# Patient Record
Sex: FEMALE | URBAN - METROPOLITAN AREA
[De-identification: names, ages, dates, MRNs, and addresses within clinical notes are randomized per-mention and may not be internally consistent; named-entity substitution may affect disease eponyms.]

---

## 2024-09-19 ENCOUNTER — TELEPHONE (OUTPATIENT)
Facility: CLINIC | Age: 34
End: 2024-09-19

## 2024-09-19 NOTE — TELEPHONE ENCOUNTER
----- Message from Ariana LIN sent at 9/19/2024  9:09 AM EDT -----  Regarding: [facility] Appointment Request  [facility] Appointment Request    Patient needs appointment for [facility] Appointment Type: New to Provider.    Patient Requested Dates(s): September 24 or October 1, 2024   Patient Requested Time: Any time of the day  Provider Name:  female pcp     Reason for Appointment Request: New Patient - No appointments available during search.  The patient is requesting to set an appointment to be establish  with any of the female PCP.  --------------------------------------------------------------------------------------------------------------------------    Relationship to Patient: Self     Call Back Information: OK to leave message on "[employer]  Preferred Call Back Number: Phone  [phone]

## 2024-11-18 ENCOUNTER — OFFICE VISIT (OUTPATIENT)
Facility: CLINIC | Age: 34
End: 2024-11-18

## 2024-11-18 VITALS
SYSTOLIC BLOOD PRESSURE: 113 MMHG | DIASTOLIC BLOOD PRESSURE: 77 MMHG | WEIGHT: 273.6 LBS | HEART RATE: 64 BPM | BODY MASS INDEX: 39.17 KG/M2 | TEMPERATURE: 96.8 F | OXYGEN SATURATION: 99 % | HEIGHT: 70 IN | RESPIRATION RATE: 17 BRPM

## 2024-11-18 DIAGNOSIS — Z11.59 NEED FOR HEPATITIS C SCREENING TEST: ICD-10-CM

## 2024-11-18 DIAGNOSIS — Z00.00 ENCOUNTER FOR WELL ADULT EXAM WITHOUT ABNORMAL FINDINGS: Primary | ICD-10-CM

## 2024-11-18 DIAGNOSIS — Z11.4 SCREENING FOR HIV WITHOUT PRESENCE OF RISK FACTORS: ICD-10-CM

## 2024-11-18 DIAGNOSIS — R45.89 OTHER SYMPTOMS AND SIGNS INVOLVING EMOTIONAL STATE: ICD-10-CM

## 2024-11-18 ASSESSMENT — ENCOUNTER SYMPTOMS
ABDOMINAL PAIN: 0
VOMITING: 0
BLOOD IN STOOL: 0
COUGH: 0
CONSTIPATION: 0
DIARRHEA: 0
NAUSEA: 0
SHORTNESS OF BREATH: 0
ABDOMINAL DISTENTION: 0

## 2024-11-18 ASSESSMENT — PATIENT HEALTH QUESTIONNAIRE - PHQ9
SUM OF ALL RESPONSES TO PHQ QUESTIONS 1-9: 0
2. FEELING DOWN, DEPRESSED OR HOPELESS: NOT AT ALL
1. LITTLE INTEREST OR PLEASURE IN DOING THINGS: NOT AT ALL
SUM OF ALL RESPONSES TO PHQ9 QUESTIONS 1 & 2: 0
SUM OF ALL RESPONSES TO PHQ QUESTIONS 1-9: 0

## 2024-11-18 NOTE — PROGRESS NOTES
Identified pt with two pt identifiers(name and ). Reviewed record in preparation for visit and have obtained necessary documentation. All patient medications has been reviewed.  Chief Complaint   Patient presents with    New Patient    Establish Care     Pt stated she would like to establish care.     Menstrual Problem    Foot Pain       Vitals:    24 1140   BP: 113/77   Pulse: 64   Resp: 17   Temp: 96.8 °F (36 °C)   SpO2: 99%                   Coordination of Care Questionnaire:   1) Have you been to an emergency room, urgent care, or hospitalized since your last visit?   No       2. Have seen or consulted any other health care provider since your last visit? No        Patient is accompanied by sandra I have received verbal consent from David Brady to discuss any/all medical information while they are present in the room.

## 2024-11-18 NOTE — PROGRESS NOTES
David Brady  33 y.o. female  1990  MRN:[id number]  [facility] MEDICINE  Progress Note       David Brady is a 33 y.o. female presenting for an annual physical exam and to establish care.    Ms. Brady is here today with her fiancé.  She reports having anxiety, depression.  She has seen a counselor in the past but not currently.      Patient sees GYN for Pap smears.    She reports fatigue and one episode of chest pain.    Health maintenance:    The following sections were reviewed & updated as appropriate: PMH, PSH, FH, and SH.    There is no problem list on file for this patient.         Prior to Admission medications    Not on File          No Known Allergies      Smoker:   Social History     Tobacco Use   Smoking Status Never   Smokeless Tobacco Never     ETOH:   Social History     Substance and Sexual Activity   Alcohol Use Never       FH:    History reviewed. No pertinent family history.      Eye exam and Glaucoma Screening: not up to date - patient to schedule    Last dental exam: up to date    Review of Systems   Constitutional:  Positive for fatigue. Negative for chills and fever.   Respiratory:  Negative for cough and shortness of breath.    Cardiovascular:  Positive for chest pain (Occurred once, resolved without any intervention, not associated with dizziness, shortness of breath.  Patient reports increased stress.). Negative for palpitations.   Gastrointestinal:  Negative for abdominal distention, abdominal pain, blood in stool, constipation, diarrhea, nausea and vomiting.   Genitourinary:  Negative for dysuria.   Musculoskeletal:  Negative for arthralgias and myalgias.   Neurological:  Positive for headaches. Negative for dizziness and numbness.   Hematological:  Does not bruise/bleed easily.   Psychiatric/Behavioral:  Negative for dysphoric mood and sleep disturbance. The patient is nervous/anxious.            Physical Exam:  /77   Pulse 64   Temp 96.8 °F

## 2024-11-27 DIAGNOSIS — E55.9 VITAMIN D DEFICIENCY: Primary | ICD-10-CM

## 2024-11-27 RX ORDER — ERGOCALCIFEROL 1.25 MG/1
50000 CAPSULE, LIQUID FILLED ORAL WEEKLY
Qty: 12 CAPSULE | Refills: 0 | Status: SHIPPED | OUTPATIENT
Start: 2024-11-27

## 2024-12-17 ENCOUNTER — TELEPHONE (OUTPATIENT)
Facility: CLINIC | Age: 34
End: 2024-12-17

## 2024-12-17 NOTE — TELEPHONE ENCOUNTER
Patient states she was unable to pick the vitamin D (ERGOCALCIFEROL) 1.25 MG (18425 UT) CAPS capsule  medication up and it has been put back pharmacy needs medication to be resent per the patient.

## 2024-12-23 ENCOUNTER — TELEPHONE (OUTPATIENT)
Facility: CLINIC | Age: 34
End: 2024-12-23

## 2024-12-23 NOTE — TELEPHONE ENCOUNTER
Last visit 11/18/2024 patient stated PCP promised to send a RX to pharmacy. Rx was not received . Pt asking if pcp can resend RX.  If approved send RX to [facility] on [address]

## 2024-12-23 NOTE — TELEPHONE ENCOUNTER
Called patient, verified id [phone]. Notified patient she can call her pharmacy to refill medication. Patient understood verbal message given.